# Patient Record
Sex: FEMALE
[De-identification: names, ages, dates, MRNs, and addresses within clinical notes are randomized per-mention and may not be internally consistent; named-entity substitution may affect disease eponyms.]

---

## 2019-08-01 PROBLEM — Z00.00 ENCOUNTER FOR PREVENTIVE HEALTH EXAMINATION: Status: ACTIVE | Noted: 2019-08-01

## 2019-08-19 ENCOUNTER — APPOINTMENT (OUTPATIENT)
Dept: BREAST CENTER | Facility: CLINIC | Age: 44
End: 2019-08-19

## 2019-09-27 ENCOUNTER — APPOINTMENT (OUTPATIENT)
Dept: BREAST CENTER | Facility: CLINIC | Age: 44
End: 2019-09-27
Payer: COMMERCIAL

## 2019-09-27 VITALS
HEIGHT: 65 IN | SYSTOLIC BLOOD PRESSURE: 109 MMHG | TEMPERATURE: 98.6 F | HEART RATE: 84 BPM | BODY MASS INDEX: 25.66 KG/M2 | DIASTOLIC BLOOD PRESSURE: 75 MMHG | WEIGHT: 154 LBS | OXYGEN SATURATION: 99 %

## 2019-09-27 DIAGNOSIS — Z82.49 FAMILY HISTORY OF ISCHEMIC HEART DISEASE AND OTHER DISEASES OF THE CIRCULATORY SYSTEM: ICD-10-CM

## 2019-09-27 DIAGNOSIS — Z78.9 OTHER SPECIFIED HEALTH STATUS: ICD-10-CM

## 2019-09-27 DIAGNOSIS — Z83.3 FAMILY HISTORY OF DIABETES MELLITUS: ICD-10-CM

## 2019-09-27 DIAGNOSIS — Z86.79 PERSONAL HISTORY OF OTHER DISEASES OF THE CIRCULATORY SYSTEM: ICD-10-CM

## 2019-09-27 DIAGNOSIS — N63.0 UNSPECIFIED LUMP IN UNSPECIFIED BREAST: ICD-10-CM

## 2019-09-27 PROCEDURE — 99203 OFFICE O/P NEW LOW 30 MIN: CPT

## 2019-09-27 RX ORDER — CHOLECALCIFEROL (VITAMIN D3) 25 MCG
TABLET ORAL
Refills: 0 | Status: ACTIVE | COMMUNITY

## 2019-09-27 RX ORDER — BIOTIN 10 MG
TABLET ORAL
Refills: 0 | Status: ACTIVE | COMMUNITY

## 2019-09-27 RX ORDER — ASCORBIC ACID 500 MG
TABLET ORAL
Refills: 0 | Status: ACTIVE | COMMUNITY

## 2019-09-27 RX ORDER — LOSARTAN POTASSIUM 50 MG/1
50 TABLET, FILM COATED ORAL
Refills: 0 | Status: ACTIVE | COMMUNITY

## 2019-09-27 RX ORDER — LOSARTAN POTASSIUM 100 MG/1
TABLET, FILM COATED ORAL
Refills: 0 | Status: DISCONTINUED | COMMUNITY

## 2019-09-27 NOTE — PHYSICAL EXAM
[Normocephalic] : normocephalic [Atraumatic] : atraumatic [No Supraclavicular Adenopathy] : no supraclavicular adenopathy [Supple] : supple [No Thyromegaly] : no thyromegaly [Examined in the supine and seated position] : examined in the supine and seated position [Bra Size: ___] : Bra Size: [unfilled] [No dominant masses] : no dominant masses in right breast  [No dominant masses] : no dominant masses left breast [No Nipple Retraction] : no right nipple retraction [No Nipple Discharge] : no left nipple discharge [No Axillary Lymphadenopathy] : no left axillary lymphadenopathy [No Edema] : no edema [No Rashes] : no rashes [No Ulceration] : no ulceration [No Swelling] : no swelling [Full ROM] : full range of motion

## 2019-11-12 ENCOUNTER — APPOINTMENT (OUTPATIENT)
Dept: ULTRASOUND IMAGING | Facility: HOSPITAL | Age: 44
End: 2019-11-12

## 2019-11-26 ENCOUNTER — APPOINTMENT (OUTPATIENT)
Dept: ULTRASOUND IMAGING | Facility: CLINIC | Age: 44
End: 2019-11-26

## 2019-12-10 NOTE — DATA REVIEWED
[FreeTextEntry1] : 11/11/19 [facility] S[facility], priors 2013, 2018, dense, scattered benign calcs, colby benign axillary LN. BR2D.

## 2019-12-10 NOTE — PAST MEDICAL HISTORY
[Menarche Age ____] : age at menarche was [unfilled] [Definite ___ (Date)] : the last menstrual period was [unfilled] [Total Preg ___] : G[unfilled] [Living ___] : Living: [unfilled] [Age At Live Birth ___] : Age at live birth: [unfilled] [History of Hormone Replacement Treatment] : has no history of hormone replacement treatment [FreeTextEntry7] : 1 yr [FreeTextEntry8] : 2 yrs

## 2019-12-10 NOTE — HISTORY OF PRESENT ILLNESS
[FreeTextEntry1] : Pt is a 43 y/o female, here for f/u 1/9/19 for colby complex cysts/ questionable enlarged axillary LN, mmg/US results. \par \par 11/11/19 [facility], priors 2013, 2018, dense, scattered benign calcs, colby benign axillary LN. BR2D.\par \par No FHx breast cancer or other cancers. \par Pt denies any breast lesions, discharge or masses but states occasional breast pain and diffuse lumpiness. Drinks 1 large coffee a day and pt is perimenopausal. \par \par 9/9/18 [facility] S[facility], no comparison, dense, questionable prominent R LN. No susp abn masses or abn noted. BRO. Rec colby US.\par 9/30/18 [facility] US, compared to 2013, R 3:00 (5X3mm) hypoechoic nodule\par                                                                   7:00 (5X3mm) hypoechoic nodule\par                                                                   9:00(7X3mm) complex cyst with septation\par                                                                  *several axillary LN, largest (2.4X0.7cm)\par                                                                 L (5X2mm) hypoechoic nodule.\par                                                                 BR3 F/U imaging rec 6 mo.\par 6/10/19 [facility]  US, no comparison, glandular, LN wnl colby \par                                                                 R 9:00 N6 subcentimeter benign cyst\par                                                                     RA duct ectasia w/o internal vascularity/debris;  \par                                                                  L *12:00 N3 subcentimeter benign complex cyst\par                                                                     *12:00 PA ([id number]) parallel hypoechoic mass or complex                                                                     cyst, benign \par                                                                      RA mild duct ectasia with mild low level internal                                                                     echogenicity/prob debris w/o vascularity benign.\par                                                       BR3. Rec L US 6 mo. Prior studies should be submitted for comparison.

## 2019-12-11 ENCOUNTER — APPOINTMENT (OUTPATIENT)
Dept: BREAST CENTER | Facility: CLINIC | Age: 44
End: 2019-12-11

## 2019-12-11 NOTE — REVIEW OF SYSTEMS
[Negative] : Heme/Lymph [FreeTextEntry5] : had recent cardiac workup due to ? chest pain, found to be GI related.

## 2019-12-11 NOTE — PAST MEDICAL HISTORY
[Living ___] : Living: [unfilled] [Definite ___ (Date)] : the last menstrual period was [unfilled] [Age At Live Birth ___] : Age at live birth: [unfilled] [FreeTextEntry7] : 1 yr [FreeTextEntry8] : 1 yrs [History of Hormone Replacement Treatment] : has no history of hormone replacement treatment

## 2019-12-11 NOTE — ASSESSMENT
[FreeTextEntry1] : Pt is a 42 y/o female, here for initial consultation PCP(Dr. Yumiko Lei). Had consult Dr. Katelin Santiago 1/9/19 at [facility] for clark complex cysts/ questionable enlarged axillary LN. Pt here with her  Jaguar. \par No FHx breast cancer or other cancers. \par Pt denies any breast lesions, discharge or masses but states occasional breast pain and diffuse lumpiness. Drinks 1 large coffee a day and pt is perimenopausal. \par \par 9/9/18 [facility] S[facility], no comparison, dense, questionable prominent R LN. No susp abn masses or abn noted. BRO. Rec clark US.\par 9/30/18 [facility] US, compared to 2013, R 3:00 (5X3mm) hypoechoic nodule\par                                                                   7:00 (5X3mm) hypoechoic nodule\par                                                                   9:00(7X3mm) complex cyst with septation\par                                                                  *several axillary LN, largest (2.4X0.7cm)\par                                                                 L (5X2mm) hypoechoic nodule.\par                                                                 BR3 F/U imaging rec 6 mo.\par 6/10/19 [facility]  US, no comparison, glandular, LN wnl clark \par                                                                 R 9:00 N6 subcentimeter benign cyst\par                                                                     RA duct ectasia w/o internal vascularity/debris;  \par                                                                  L *12:00 N3 subcentimeter benign complex cyst\par                                                                     *12:00 PA ([id number]) parallel hypoechoic mass or complex                                                                     cyst, benign \par                                                                      RA mild duct ectasia with mild low level internal                                                                     echogenicity/prob debris w/o vascularity benign.\par                                                       BR3. Rec L US 6 mo. Prior studies should be submitted for comparison.\par CBE: CYNTHIA.  Reviewed studies with pt. Due for bilat mmg and 12/19 f.u u/s, would do both in Oct/ Nov.\par Discussed prob perimenopausal, rec symptom treatment for occasional mastalgia. No suspicious findings on CBE. Rec decrease caffeine intake.

## 2019-12-11 NOTE — DATA REVIEWED
[FreeTextEntry1] : 9/9/18 [facility] Smmg[facility], no comparison, dense, questionable prominent R LN. No susp abn masses or abn noted. BRO. Rec colby US.\par 11/30/18 [facility] US, compared to 2013, R 3:00 (5X3mm) hypoechoic nodule\par                                                                   7:00 (5X3mm) hypoechoic nodule\par                                                                   9:00(7X3mm) complex cyst with septation\par                                                                  *several axillary LN, largest (2.4X0.7cm)\par                                                                 L 12:00 (6X2mm) hypoechoic nodule. BR3 F/U imaging rec 6 mo.\par 6/10/19 [facility] US, no comparison, glandular, LN wnl colby \par                                                                 R 9:00 N6 subcentimeter benign cyst\par                                                                     RA duct ectasia w/o internal vascularity/debris;  \par                                                                  L *12:00 N3 subcentimeter benign complex cyst\par                                                                     *12:00 PA ([id number]) parallel hypoechoic mass or complex                                                                               cyst, benign \par                                                                      RA mild duct ectasia with mild low level internal                                                                           echogenicity/prob debris w/o vascularity benign.\par BR3. Rec L US 6 mo. Prior studies should be submitted for comparison.\par

## 2019-12-11 NOTE — HISTORY OF PRESENT ILLNESS
[FreeTextEntry1] : Pt is a 44 y/o female, here for initial consultation PCP(Dr. Yumiko Lei). Had consult Dr. Katelin Santiago 1/9/19 at [facility] for clark complex cysts/ questionable enlarged axillary LN. Pt here with her  Jaguar. \par No FHx breast cancer or other cancers. \par Pt denies any breast lesions, discharge or masses but states occasional breast pain and diffuse lumpiness. Drinks 1 large coffee a day and pt is perimenopausal. \par \par 9/9/18 [facility], no comparison, dense, questionable prominent R LN. No susp abn masses or abn noted. BRO. Rec clark US.\par 9/30/18 [facility] US, compared to 2013, R 3:00 (5X3mm) hypoechoic nodule\par                                                                   7:00 (5X3mm) hypoechoic nodule\par                                                                   9:00(7X3mm) complex cyst with septation\par                                                                  *several axillary LN, largest (2.4X0.7cm)\par                                                                 L (5X2mm) hypoechoic nodule.\par                                                                 BR3 F/U imaging rec 6 mo.\par 6/10/19 [facility]  US, no comparison, glandular, LN wnl clark \par                                                                 R 9:00 N6 subcentimeter benign cyst\par                                                                     RA duct ectasia w/o internal vascularity/debris;  \par                                                                  L *12:00 N3 subcentimeter benign complex cyst\par                                                                     *12:00 PA ([id number]) parallel hypoechoic mass or complex                                                                     cyst, benign \par                                                                      RA mild duct ectasia with mild low level internal                                                                     echogenicity/prob debris w/o vascularity benign.\par                                                       BR3. Rec L US 6 mo. Prior studies should be submitted for comparison.

## 2019-12-11 NOTE — ADDENDUM
[FreeTextEntry1] : 11/11/19 [facility], priors 2013, 2018, dense, scattered benign calcs, colby benign axillary LN. BR2D.\par 12/3/19 [facility] US: 12:00 N3 (2mm) cyst dec in size, 12:00 N1 (5mm) stable anechoic cyst. BR2

## 2020-01-09 NOTE — HISTORY OF PRESENT ILLNESS
[FreeTextEntry1] : Pt is a 44 y/o female, here for follow up and mmg/US results.\par \par 11/11/19 [facility] Colby Smmg, priors 2013, 2018, dense, scattered benign calcs, colby benign axillary LN. BR2D.\par 12/3/19 [facility] L US: 12:00 N3 (2mm) cyst dec in size, 12:00 N1 (5mm) stable anechoic cyst. BR2 \par \par Had consult Dr. Katelin Santiago 1/9/19 at [facility] for colby complex cysts/ questionable enlarged axillary LN. \par Pt here with her  Jaguar. \par No FHx breast cancer or other cancers. \par Pt denies any breast lesions, discharge or masses but states occasional breast pain and diffuse lumpiness. Drinks 1 large coffee a day and pt is perimenopausal. \par \par 9/9/18 [facility] S[facility], no comparison, dense, questionable prominent R LN. No susp abn masses or abn noted. BRO. Rec colby US.\par 9/30/18 [facility] US, compared to 2013, R 3:00 (5X3mm) hypoechoic nodule\par                                                                   7:00 (5X3mm) hypoechoic nodule\par                                                                   9:00(7X3mm) complex cyst with septation\par                                                                  *several axillary LN, largest (2.4X0.7cm)\par                                                                 L (5X2mm) hypoechoic nodule.\par                                                                 BR3 F/U imaging rec 6 mo.\par 6/10/19 [facility]  US, no comparison, glandular, LN wnl colby \par                                                                 R 9:00 N6 subcentimeter benign cyst\par                                                                     RA duct ectasia w/o internal vascularity/debris;  \par                                                                  L *12:00 N3 subcentimeter benign complex cyst\par                                                                     *12:00 PA ([id number]) parallel hypoechoic mass or complex                                                                     cyst, benign \par                                                                      RA mild duct ectasia with mild low level internal                                                                     echogenicity/prob debris w/o vascularity benign.\par                                                       BR3. Rec L US 6 mo. Prior studies should be submitted for comparison.

## 2020-01-09 NOTE — HISTORY OF PRESENT ILLNESS
[FreeTextEntry1] : Pt is a 42 y/o female, here for follow up and mmg/US results.\par \par 11/11/19 [facility] Colby Smmg, priors 2013, 2018, dense, scattered benign calcs, colby benign axillary LN. BR2D.\par 12/3/19 [facility] L US: 12:00 N3 (2mm) cyst dec in size, 12:00 N1 (5mm) stable anechoic cyst. BR2 \par \par Had consult Dr. Katelin Santiago 1/9/19 at [facility] for colby complex cysts/ questionable enlarged axillary LN. \par Pt here with her  Jaguar. \par No FHx breast cancer or other cancers. \par Pt denies any breast lesions, discharge or masses but states occasional breast pain and diffuse lumpiness. Drinks 1 large coffee a day and pt is perimenopausal. \par \par 9/9/18 [facility], no comparison, dense, questionable prominent R LN. No susp abn masses or abn noted. BRO. Rec colby US.\par 9/30/18 [facility] US, compared to 2013, R 3:00 (5X3mm) hypoechoic nodule\par                                                                   7:00 (5X3mm) hypoechoic nodule\par                                                                   9:00(7X3mm) complex cyst with septation\par                                                                  *several axillary LN, largest (2.4X0.7cm)\par                                                                 L (5X2mm) hypoechoic nodule.\par                                                                 BR3 F/U imaging rec 6 mo.\par 6/10/19 [facility]  US, no comparison, glandular, LN wnl colby \par                                                                 R 9:00 N6 subcentimeter benign cyst\par                                                                     RA duct ectasia w/o internal vascularity/debris;  \par                                                                  L *12:00 N3 subcentimeter benign complex cyst\par                                                                     *12:00 PA ([id number]) parallel hypoechoic mass or complex                                                                     cyst, benign \par                                                                      RA mild duct ectasia with mild low level internal                                                                     echogenicity/prob debris w/o vascularity benign.\par                                                       BR3. Rec L US 6 mo. Prior studies should be submitted for comparison.

## 2020-01-10 ENCOUNTER — APPOINTMENT (OUTPATIENT)
Dept: BREAST CENTER | Facility: CLINIC | Age: 45
End: 2020-01-10
Payer: COMMERCIAL

## 2020-01-10 VITALS
TEMPERATURE: 98.3 F | SYSTOLIC BLOOD PRESSURE: 107 MMHG | HEART RATE: 85 BPM | HEIGHT: 61 IN | DIASTOLIC BLOOD PRESSURE: 75 MMHG | WEIGHT: 156 LBS | BODY MASS INDEX: 29.45 KG/M2

## 2020-01-10 DIAGNOSIS — Z78.9 OTHER SPECIFIED HEALTH STATUS: ICD-10-CM

## 2020-01-10 DIAGNOSIS — R93.89 ABNORMAL FINDINGS ON DIAGNOSTIC IMAGING OF OTHER SPECIFIED BODY STRUCTURES: ICD-10-CM

## 2020-01-10 PROCEDURE — 99213 OFFICE O/P EST LOW 20 MIN: CPT

## 2020-01-10 NOTE — PHYSICAL EXAM
[Supple] : supple [Normocephalic] : normocephalic [No Supraclavicular Adenopathy] : no supraclavicular adenopathy [Atraumatic] : atraumatic [No Thyromegaly] : no thyromegaly [Examined in the supine and seated position] : examined in the supine and seated position [No dominant masses] : no dominant masses left breast [No dominant masses] : no dominant masses in right breast  [No Nipple Retraction] : no right nipple retraction [No Nipple Discharge] : no left nipple discharge [No Edema] : no edema [No Axillary Lymphadenopathy] : no left axillary lymphadenopathy [No Rashes] : no rashes [No Ulceration] : no ulceration

## 2020-01-10 NOTE — PAST MEDICAL HISTORY
[History of Hormone Replacement Treatment] : has no history of hormone replacement treatment [FreeTextEntry7] : 1 yr [FreeTextEntry8] : 2 yrs [Menarche Age ____] : age at menarche was [unfilled] [Definite ___ (Date)] : the last menstrual period was [unfilled] [Total Preg ___] : G[unfilled] [Living ___] : Living: [unfilled] [Age At Live Birth ___] : Age at live birth: [unfilled]

## 2020-01-10 NOTE — PHYSICAL EXAM
[No Supraclavicular Adenopathy] : no supraclavicular adenopathy [Normocephalic] : normocephalic [Atraumatic] : atraumatic [Supple] : supple [Examined in the supine and seated position] : examined in the supine and seated position [No Thyromegaly] : no thyromegaly [No dominant masses] : no dominant masses left breast [No dominant masses] : no dominant masses in right breast  [No Nipple Retraction] : no left nipple retraction [No Nipple Discharge] : no left nipple discharge [No Edema] : no edema [No Axillary Lymphadenopathy] : no left axillary lymphadenopathy [No Ulceration] : no ulceration [No Rashes] : no rashes

## 2020-04-15 ENCOUNTER — TRANSCRIPTION ENCOUNTER (OUTPATIENT)
Age: 45
End: 2020-04-15

## 2020-12-11 ENCOUNTER — APPOINTMENT (OUTPATIENT)
Dept: BREAST CENTER | Facility: CLINIC | Age: 45
End: 2020-12-11
Payer: COMMERCIAL

## 2020-12-11 VITALS
WEIGHT: 156 LBS | OXYGEN SATURATION: 99 % | HEIGHT: 61 IN | SYSTOLIC BLOOD PRESSURE: 126 MMHG | DIASTOLIC BLOOD PRESSURE: 85 MMHG | HEART RATE: 81 BPM | BODY MASS INDEX: 29.45 KG/M2

## 2020-12-11 VITALS — TEMPERATURE: 97.9 F

## 2020-12-11 DIAGNOSIS — N60.02 SOLITARY CYST OF RIGHT BREAST: ICD-10-CM

## 2020-12-11 DIAGNOSIS — N60.01 SOLITARY CYST OF RIGHT BREAST: ICD-10-CM

## 2020-12-11 DIAGNOSIS — R92.2 INCONCLUSIVE MAMMOGRAM: ICD-10-CM

## 2020-12-11 PROBLEM — Z78.9 NO FAMILY HISTORY OF MALIGNANT NEOPLASM OF BREAST: Status: ACTIVE | Noted: 2019-08-18

## 2020-12-11 PROBLEM — R93.89 ABNORMAL ULTRASOUND: Status: ACTIVE | Noted: 2019-08-18

## 2020-12-11 PROCEDURE — 99072 ADDL SUPL MATRL&STAF TM PHE: CPT

## 2020-12-11 PROCEDURE — 99214 OFFICE O/P EST MOD 30 MIN: CPT

## 2020-12-11 NOTE — PAST MEDICAL HISTORY
[History of Hormone Replacement Treatment] : has no history of hormone replacement treatment [FreeTextEntry7] : 1 yr [FreeTextEntry8] : 2 yrs

## 2020-12-11 NOTE — HISTORY OF PRESENT ILLNESS
[FreeTextEntry1] : Pt is a 44 y/o female, here for follow up and mmg/US results. Denies any breast lesions, discharge or masses. No new complaints. Stopped drinking caffeine and some improvement in mastalgia but still has it on occasion. \par Pt denies any breast lesions, discharge or masses.\par \par 12/1/20, [facility]MMG Compared to 2018, Dense breasts, BR2  Bilat u/s, Right 9:00 6N 8 mm cyst vs dilated duct, stable, left breast 1N 6x5 mm benign appearing cyst, stable. BR2. \par  \par Pt here with her  Jaguar. \par No FHx breast cancer or other cancers. \par \par

## 2020-12-11 NOTE — ASSESSMENT
[FreeTextEntry1] : Pt is a 44 y/o female, here for follow up and mmg/US results. Denies any breast lesions, discharge or masses. No new complaints. Stopped drinking caffeine and some improvement in mastalgia but still has it on occasion. \par Pt denies any breast lesions, discharge or masses.\par \par 12/1/20, [facility]G Compared to 2018, Dense breasts, BR2  Bilat u/s, Right 9:00 6N 8 mm cyst vs dilated duct, stable, left breast 1N 6x5 mm benign appearing cyst, stable. BR2. \par  \par Pt here with her  Jaguar. \par No FHx breast cancer or other cancers. \par \par CBE:CYNTHIA.\par  Reviewed with pt recent mmg and u/s.  No suspicious findings on studies of CBE. Rec NSAIDS or tylenol for discomfort, reassured bilat occasion breast discomfort without any other associated findings usually not a sign of cancer. Rec annual mmg and u/s due 12/21. May resume screenings with PCP.  F.u if breast issues with breast surgeon. Notified, I will not longer be at [facility], will be in [place]. \par

## 2022-07-25 ENCOUNTER — APPOINTMENT (OUTPATIENT)
Dept: BREAST CENTER | Facility: CLINIC | Age: 47
End: 2022-07-25

## 2023-01-19 ENCOUNTER — APPOINTMENT (OUTPATIENT)
Dept: ORTHOPEDIC SURGERY | Facility: CLINIC | Age: 48
End: 2023-01-19

## 2024-03-16 ENCOUNTER — APPOINTMENT (OUTPATIENT)
Dept: OPHTHALMOLOGY | Facility: CLINIC | Age: 49
End: 2024-03-16